# Patient Record
Sex: MALE | Race: WHITE | URBAN - METROPOLITAN AREA
[De-identification: names, ages, dates, MRNs, and addresses within clinical notes are randomized per-mention and may not be internally consistent; named-entity substitution may affect disease eponyms.]

---
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
## 2019-03-18 NOTE — PROGRESS NOTES
Sadie Berger    is a 19year old male. Patient presents with: Other: [place] trip. Mike Richards june 1sts for three weeks. .pt needs  clearance due to meds. .  room 2      HPI:   Going to [place] for cooking classes.  Needs note ok to go while on sertrali

## 2019-04-25 NOTE — TELEPHONE ENCOUNTER
Last office visit 3-18-19 (office note stated on Sertraline)  Last refill 11-2-17 #90 with 2  Attempted to reach patient regarding lapse in refills. No answer at home number.

## 2019-04-29 NOTE — TELEPHONE ENCOUNTER
The patient called back because he is at school in [place]. He does take the Sertraline. There was a period at the end of last year where he was not taking it.      He does take Sertraline 50mg daily